# Patient Record
Sex: FEMALE | Race: OTHER | Employment: UNEMPLOYED | URBAN - METROPOLITAN AREA
[De-identification: names, ages, dates, MRNs, and addresses within clinical notes are randomized per-mention and may not be internally consistent; named-entity substitution may affect disease eponyms.]

---

## 2023-12-22 ENCOUNTER — HOSPITAL ENCOUNTER (EMERGENCY)
Facility: HOSPITAL | Age: 28
Discharge: HOME | End: 2023-12-22
Attending: STUDENT IN AN ORGANIZED HEALTH CARE EDUCATION/TRAINING PROGRAM

## 2023-12-22 NOTE — ED INITIAL ASSESSMENT (HPI)
Pt BIB EMS for numbness to bilateral arms. Pt states she woke up with it. States it feels like when \"your arm goes to sleep\". Pt is 16 weeks pregnant and has a hx of preeclampsia with previous pregnancy. Pt also c/o groin pain that has been going on x1 week.